# Patient Record
Sex: FEMALE | Race: WHITE | NOT HISPANIC OR LATINO | Employment: OTHER | RURAL
[De-identification: names, ages, dates, MRNs, and addresses within clinical notes are randomized per-mention and may not be internally consistent; named-entity substitution may affect disease eponyms.]

---

## 2017-01-01 ENCOUNTER — OFFICE VISIT (OUTPATIENT)
Dept: CARDIOLOGY | Facility: CLINIC | Age: 67
End: 2017-01-01

## 2017-01-01 VITALS
BODY MASS INDEX: 31.34 KG/M2 | WEIGHT: 195 LBS | SYSTOLIC BLOOD PRESSURE: 132 MMHG | HEART RATE: 82 BPM | DIASTOLIC BLOOD PRESSURE: 70 MMHG | HEIGHT: 66 IN

## 2017-01-01 DIAGNOSIS — R55 VASOVAGAL SYNCOPE: Primary | ICD-10-CM

## 2017-01-01 DIAGNOSIS — J42 CHRONIC BRONCHITIS, UNSPECIFIED CHRONIC BRONCHITIS TYPE (HCC): ICD-10-CM

## 2017-01-01 DIAGNOSIS — I47.1 SVT (SUPRAVENTRICULAR TACHYCARDIA) (HCC): ICD-10-CM

## 2017-01-01 PROCEDURE — 93291 INTERROG DEV EVAL SCRMS IP: CPT | Performed by: INTERNAL MEDICINE

## 2017-01-01 PROCEDURE — 99213 OFFICE O/P EST LOW 20 MIN: CPT | Performed by: INTERNAL MEDICINE

## 2017-01-01 RX ORDER — FLECAINIDE ACETATE 50 MG/1
50 TABLET ORAL EVERY 12 HOURS
Qty: 180 TABLET | Refills: 2 | Status: SHIPPED | OUTPATIENT
Start: 2017-01-01

## 2017-03-16 ENCOUNTER — OFFICE VISIT (OUTPATIENT)
Dept: CARDIOLOGY | Facility: CLINIC | Age: 67
End: 2017-03-16

## 2017-03-16 VITALS
BODY MASS INDEX: 32.78 KG/M2 | HEIGHT: 66 IN | HEART RATE: 87 BPM | DIASTOLIC BLOOD PRESSURE: 66 MMHG | SYSTOLIC BLOOD PRESSURE: 140 MMHG | WEIGHT: 204 LBS

## 2017-03-16 DIAGNOSIS — J41.8 MIXED SIMPLE AND MUCOPURULENT CHRONIC BRONCHITIS (HCC): ICD-10-CM

## 2017-03-16 DIAGNOSIS — I47.1 SVT (SUPRAVENTRICULAR TACHYCARDIA) (HCC): ICD-10-CM

## 2017-03-16 DIAGNOSIS — R55 SYNCOPE, UNSPECIFIED SYNCOPE TYPE: Primary | ICD-10-CM

## 2017-03-16 PROCEDURE — 99213 OFFICE O/P EST LOW 20 MIN: CPT | Performed by: INTERNAL MEDICINE

## 2017-03-16 PROCEDURE — 93291 INTERROG DEV EVAL SCRMS IP: CPT | Performed by: INTERNAL MEDICINE

## 2017-03-16 RX ORDER — OMEPRAZOLE 20 MG/1
20 CAPSULE, DELAYED RELEASE ORAL 2 TIMES DAILY
COMMUNITY

## 2017-03-16 RX ORDER — ROSUVASTATIN CALCIUM 20 MG/1
20 TABLET, COATED ORAL DAILY
COMMUNITY

## 2017-03-16 RX ORDER — ISOSORBIDE MONONITRATE 60 MG/1
60 TABLET, EXTENDED RELEASE ORAL DAILY
COMMUNITY

## 2017-03-16 RX ORDER — TORSEMIDE 20 MG/1
40 TABLET ORAL 2 TIMES DAILY
COMMUNITY

## 2017-03-16 RX ORDER — SENNOSIDES 8.6 MG
650 CAPSULE ORAL EVERY 8 HOURS PRN
COMMUNITY

## 2017-03-16 RX ORDER — ROFLUMILAST 500 UG/1
500 TABLET ORAL DAILY
COMMUNITY

## 2017-03-16 RX ORDER — MONTELUKAST SODIUM 10 MG/1
10 TABLET ORAL NIGHTLY
COMMUNITY
End: 2017-01-01

## 2017-03-16 RX ORDER — SERTRALINE HYDROCHLORIDE 100 MG/1
100 TABLET, FILM COATED ORAL DAILY
COMMUNITY

## 2017-03-16 RX ORDER — FLECAINIDE ACETATE 50 MG/1
25 TABLET ORAL EVERY 12 HOURS
COMMUNITY
End: 2017-03-17 | Stop reason: SDUPTHER

## 2017-03-16 RX ORDER — FUROSEMIDE 20 MG/1
20 TABLET ORAL 2 TIMES DAILY
COMMUNITY

## 2017-03-16 RX ORDER — POTASSIUM CHLORIDE 20 MEQ/1
20 TABLET, EXTENDED RELEASE ORAL 2 TIMES DAILY
COMMUNITY

## 2017-03-16 NOTE — PROGRESS NOTES
Gladys Hatfield  1950  [phone]      03/16/2017    [facility] CARDIOLOGY     Mateo Diaz MD  [address] [place] [address]    Chief Complaint   Patient presents with   • Loss of Consciousness       1. Syncope:  a. Episode of syncope without oxygen on/admission to [facility] on 05/25/2014.  b. Cardiolite stress test revealing no evidence of ischemia on 06/03/2014.   c. Echocardiogram, 05/27/2014, revealing normal LV function, diastolic dysfunction, E to A reversal, mild mitral annular calcification, normal mitral valve leaflet mobility and function, mild calcification and thickening of the aortic valve leaflet with normal aortic valve leaflet, no aortic insufficiency.   d. Holter monitor on 06/02/2014, revealing rare PACs, episode of nonsustained VT, occasional PVCs.   e. Baseline right bundle branch block on 06/24/2014.   f. Left heart catheterization in 2013 revealing no significant coronary disease, normal LV function on 06/25/2013, by Dr. Diaz.   g. Implantation of a [employer] implantable loop recorder, July 2014, Dr. Shanks.   h. Interrogation of device, 10/10/2014 demonstrated atrial fibrillation/atrial flutter with initiation of Tambocor.   2. Advanced COPD/home oxygen with initial nasal cannula oxygen placement on 05/25/2014.  3. Obstructive sleep apnea/CPAP use.  4. Diabetes mellitus type 2.   5. GERD.  6. Remote laryngoscopy, October 2013.   7. Ongoing tobacco abuse    Allergies  Allergies   Allergen Reactions   • Betadine [Povidone Iodine]    • Codeine    • Toradol Oral [Ketorolac Tromethamine]        Current Medications    Current Outpatient Prescriptions:   •  acetaminophen (TYLENOL) 650 MG 8 hr tablet, Take 650 mg by mouth Every 8 (Eight) Hours As Needed for Mild Pain (1-3)., Disp: , Rfl:   •  Ascorbic Acid (VITAMIN C PO), Take  by mouth., Disp: , Rfl:   •  B Complex Vitamins (VITAMIN B COMPLEX PO), Take  by mouth., Disp: , Rfl:  "  •  Cholecalciferol (VITAMIN D-3 PO), Take  by mouth., Disp: , Rfl:   •  flecainide (TAMBOCOR) 50 MG tablet, Take 25 mg by mouth Every 12 (Twelve) Hours., Disp: , Rfl:   •  furosemide (LASIX) 20 MG tablet, Take 20 mg by mouth 2 (Two) Times a Day., Disp: , Rfl:   •  isosorbide mononitrate (IMDUR) 60 MG 24 hr tablet, Take 60 mg by mouth Daily., Disp: , Rfl:   •  metFORMIN (GLUCOPHAGE) 1000 MG tablet, Take 1,000 mg by mouth 2 (Two) Times a Day With Meals., Disp: , Rfl:   •  montelukast (SINGULAIR) 10 MG tablet, Take 10 mg by mouth Every Night., Disp: , Rfl:   •  omeprazole (priLOSEC) 20 MG capsule, Take 20 mg by mouth 2 (Two) Times a Day., Disp: , Rfl:   •  potassium chloride (K-DUR,KLOR-CON) 20 MEQ CR tablet, Take 20 mEq by mouth 2 (Two) Times a Day., Disp: , Rfl:   •  roflumilast (DALIRESP) 500 MCG tablet tablet, Take 500 mcg by mouth Daily., Disp: , Rfl:   •  rosuvastatin (CRESTOR) 20 MG tablet, Take 20 mg by mouth Daily., Disp: , Rfl:   •  sertraline (ZOLOFT) 100 MG tablet, Take 100 mg by mouth Daily., Disp: , Rfl:   •  torsemide (DEMADEX) 20 MG tablet, Take 40 mg by mouth 2 (Two) Times a Day., Disp: , Rfl:     History of Present Illness   HPI    Pt presents for follow up of syncope Since the pt has seen us, pt denies any syncopal episodes. Rare palps less than a few seconds. Chronic severe SOB. Rare CP, LH, and dizziness. Chronic back pain with upcoming steroid injections planned. Overall feels tired. Tolerating medications without difficulty.    ROS:  General:  + fatigue, No weight gain or loss  Cardiovascular:  Denies CP, PND, syncope, near syncope, edema +  palpitations.  Pulmonary:  + DUMONT, cough, wheezing    Vitals:    03/16/17 1158   BP: 140/66   BP Location: Left arm   Patient Position: Sitting   Pulse: 87   Weight: 204 lb (92.5 kg)   Height: 66\" (167.6 cm)       PE:  General: NAD  Neck: no JVD, no carotid bruits, no TM  Heart RRR, NL S1, S2, S4 present, no rubs, murmurs  Lungs: Diffuse wheezes, rhonchi, no " rales  Abd: soft, non-tender, NL BS  Ext: No musculoskeletal deformities, no edema, cyanosis, or clubbing  Psych: normal mood and affect    Diagnostic Data:  Procedures    1. Syncope, unspecified syncope type    2. SVT (supraventricular tachycardia)    3. Mixed simple and mucopurulent chronic bronchitis            ILR: No significant bradycardia, 2 short less than 10 secs episodes of -180 bpm    Plan:  1) Syncope:   Doing well. No recurrence. Continue present medications.   2)  SVT/AFL: no significant recurrence on flecainide  3)  AC: on ASA/Plavix: Does not want NOAC  4) Back pain. Scheduled for back injections with pain specialist  Wt loss, exercise, salt reduction  50 Severe COPD    F/up in 6 months

## 2017-03-17 RX ORDER — FLECAINIDE ACETATE 50 MG/1
25 TABLET ORAL EVERY 12 HOURS
Qty: 90 TABLET | Refills: 2 | Status: SHIPPED | OUTPATIENT
Start: 2017-03-17 | End: 2017-01-01 | Stop reason: SDUPTHER

## 2017-10-19 NOTE — PROGRESS NOTES
Gladys Hatfield  1950  [phone]      10/19/2017    [facility] CARDIOLOGY     Mateo Diaz MD  [address] [place] [address]    Chief Complaint   Patient presents with   • Palpitations       Problem List:   1. Syncope:  a. Episode of syncope without oxygen on/admission to [facility] on 05/25/2014.  b. Cardiolite stress test revealing no evidence of ischemia on 06/03/2014.   c. Echocardiogram, 05/27/2014, revealing normal LV function, diastolic dysfunction, E to A reversal, mild mitral annular calcification, normal mitral valve leaflet mobility and function, mild calcification and thickening of the aortic valve leaflet with normal aortic valve leaflet, no aortic insufficiency.   d. Holter monitor on 06/02/2014, revealing rare PACs, episode of nonsustained VT, occasional PVCs.   e. Baseline right bundle branch block on 06/24/2014.   f. Left heart catheterization in 2013 revealing no significant coronary disease, normal LV function on 06/25/2013, by Dr. Diaz.   g. Implantation of a [employer] implantable loop recorder, July 2014, Dr. Shanks.   h. Interrogation of device, 10/10/2014 demonstrated atrial fibrillation/atrial flutter with initiation of Tambocor.   2. Advanced COPD/home oxygen with initial nasal cannula oxygen placement on 05/25/2014.  3. Obstructive sleep apnea/CPAP use.  4. Diabetes mellitus type 2.   5. GERD.  6. Remote laryngoscopy, October 2013.   7. Ongoing tobacco abuse  Allergies  Allergies   Allergen Reactions   • Betadine [Povidone Iodine]    • Codeine    • Toradol Oral [Ketorolac Tromethamine]        Current Medications    Current Outpatient Prescriptions:   •  acetaminophen (TYLENOL) 650 MG 8 hr tablet, Take 650 mg by mouth Every 8 (Eight) Hours As Needed for Mild Pain (1-3)., Disp: , Rfl:   •  Ascorbic Acid (VITAMIN C PO), Take  by mouth., Disp: , Rfl:   •  B Complex Vitamins (VITAMIN B COMPLEX PO), Take  by mouth., Disp: ,  Rfl:   •  Cholecalciferol (VITAMIN D-3 PO), Take  by mouth., Disp: , Rfl:   •  flecainide (TAMBOCOR) 50 MG tablet, Take 0.5 tablets by mouth Every 12 (Twelve) Hours., Disp: 90 tablet, Rfl: 2  •  Fluticasone Furoate-Vilanterol (BREO ELLIPTA) 100-25 MCG/INH aerosol powder , Inhale Daily., Disp: , Rfl:   •  furosemide (LASIX) 20 MG tablet, Take 20 mg by mouth 2 (Two) Times a Day., Disp: , Rfl:   •  isosorbide mononitrate (IMDUR) 60 MG 24 hr tablet, Take 60 mg by mouth Daily., Disp: , Rfl:   •  metFORMIN (GLUCOPHAGE) 1000 MG tablet, Take 1,000 mg by mouth 2 (Two) Times a Day With Meals., Disp: , Rfl:   •  omeprazole (priLOSEC) 20 MG capsule, Take 20 mg by mouth 2 (Two) Times a Day., Disp: , Rfl:   •  potassium chloride (K-DUR,KLOR-CON) 20 MEQ CR tablet, Take 20 mEq by mouth 2 (Two) Times a Day., Disp: , Rfl:   •  roflumilast (DALIRESP) 500 MCG tablet tablet, Take 500 mcg by mouth Daily., Disp: , Rfl:   •  rosuvastatin (CRESTOR) 20 MG tablet, Take 20 mg by mouth Daily., Disp: , Rfl:   •  sertraline (ZOLOFT) 100 MG tablet, Take 100 mg by mouth Daily., Disp: , Rfl:   •  torsemide (DEMADEX) 20 MG tablet, Take 40 mg by mouth 2 (Two) Times a Day., Disp: , Rfl:     History of Present Illness   HPI    Pt presents for follow up of SVT, syncope, and loop recorder interrogation. Since we last saw the pt, she is stable. She has occasional palpitations that do not last long, mostly precipitated by stress.  She has chronic SOB and now sees a pulmonologist in [place]. She denies CP, LH, and dizziness. Denies any hospitalizations, ER visits, bleeding, or TIA/CVA symptoms. She recently had AA with runoffs with Dr. Allison and was told she had a 10% and 30% blockage in her legs. She is still smoking.     ROS:  General:  Denies fatigue, weight gain or loss  Cardiovascular:  Denies CP, PND, syncope, near syncope, edema + palpitations.  Pulmonary:  Denies DUMONT, cough, or wheezing      Vitals:    10/19/17 1206   BP: 132/70   BP Location:  "Right arm   Patient Position: Sitting   Pulse: 82   Weight: 195 lb (88.5 kg)   Height: 66\" (167.6 cm)     PE:  General: NAD A& O x 3  Neck: no JVD, no carotid bruits, no TM  Heart RRR, NL S1, S2, S4 present, no rubs, murmurs  Lungs: Bilateral wheezes, rhonchi, or rales  Abd: soft, non-tender, NL BS  Ext: No musculoskeletal deformities, no edema, cyanosis, or clubbing  Psych: normal mood and affect    Diagnostic Data:    Loop recorder interrogation: 7 SVT episodes lasting 10 seconds or less, only one episode with 2 minute duration. 150-200 bpm. Battery at end of service.       ECG 12 Lead  Date/Time: 10/19/2017 12:51 PM  Performed by: KRISTAL LALA  Authorized by: KRISTAL LALA   Comparison: compared with previous ECG from 7/7/2014  Similar to previous ECG  Rhythm: sinus rhythm  BPM: 80  Conduction: right bundle branch block            1. Vasovagal syncope    2. SVT (supraventricular tachycardia)    3. Chronic bronchitis, unspecified chronic bronchitis type          Plan:  1) Syncope:   Doing well. No recurrence. Continue present medications.   2)  SVT/AFL: no significant recurrence on flecainide.  She is only on 25 mg BID currently and will increase to 50 mg BID.   3)  AC: on ASA/Plavix: Does not want NOAC  4) Severe COPD/tobacco abuse - counseled cessation     F/up in 6 months    Scribed for Kristal Lala MD by Melissa Rutherford PA-C. 10/19/2017  12:22 PM     Kristal ULLOA MD, personally performed the services described in this documentation as scribed by the above named individual in my presence, and it is both accurate and complete.  10/19/2017  12:52 PM    Kristal ULLOA MD, personally performed the services described in this documentation as scribed by the above named individual in my presence, and it is both accurate and complete.  10/19/2017  12:49 PM    "

## 2018-01-01 ENCOUNTER — CLINICAL SUPPORT NO REQUIREMENTS (OUTPATIENT)
Dept: CARDIOLOGY | Facility: CLINIC | Age: 68
End: 2018-01-01

## 2018-01-01 DIAGNOSIS — Z98.890 HISTORY OF LOOP RECORDER: Primary | ICD-10-CM

## 2018-05-25 NOTE — PROGRESS NOTES
Was scheduled for device check only however MDT loop recorder battery is EOS, she was not seen today.  We will reschedule her to be seen in 3 months